# Patient Record
Sex: MALE | Race: BLACK OR AFRICAN AMERICAN | NOT HISPANIC OR LATINO | Employment: UNEMPLOYED | URBAN - METROPOLITAN AREA
[De-identification: names, ages, dates, MRNs, and addresses within clinical notes are randomized per-mention and may not be internally consistent; named-entity substitution may affect disease eponyms.]

---

## 2023-05-02 PROBLEM — R63.0 LOSS OF APPETITE: Status: RESOLVED | Noted: 2023-05-02 | Resolved: 2023-05-02

## 2023-05-02 PROBLEM — R19.09 GROIN SWELLING: Status: ACTIVE | Noted: 2023-05-02

## 2023-05-02 PROBLEM — R63.4 WEIGHT LOSS, UNINTENTIONAL: Status: ACTIVE | Noted: 2023-05-02

## 2023-05-02 PROBLEM — I10 PRIMARY HYPERTENSION: Status: ACTIVE | Noted: 2023-05-02

## 2023-05-02 PROBLEM — K11.1 PAROTID GLAND ENLARGEMENT: Status: ACTIVE | Noted: 2023-05-02

## 2023-05-02 PROBLEM — R19.00 RETROPERITONEAL MASS: Status: ACTIVE | Noted: 2023-05-02

## 2023-05-02 PROBLEM — N43.3 HYDROCELE, LEFT: Status: ACTIVE | Noted: 2023-05-02

## 2023-05-02 PROBLEM — R74.8 ELEVATED LIVER ENZYMES: Status: ACTIVE | Noted: 2023-05-02

## 2023-05-02 PROBLEM — R63.0 LOSS OF APPETITE: Status: ACTIVE | Noted: 2023-05-02

## 2023-05-05 ENCOUNTER — PATIENT OUTREACH (OUTPATIENT)
Dept: ADMINISTRATIVE | Facility: CLINIC | Age: 43
End: 2023-05-05

## 2023-05-05 NOTE — PROGRESS NOTES
C3 nurse spoke with Mayito Lima for a TCC post hospital discharge follow up call. The patient reports does not have a scheduled HOSFU appointment. C3 nurse was unable to schedule HOSFU appointment for Non-[facility] PCP. Patient advised to contact their PCP to schedule a HOSPFU within 5-7 days.

## 2023-07-21 PROBLEM — R79.89 LOW VITAMIN D LEVEL: Status: ACTIVE | Noted: 2023-07-21

## 2023-07-21 PROBLEM — R25.2 MUSCLE CRAMP: Status: ACTIVE | Noted: 2023-07-21

## 2023-07-21 PROBLEM — D86.9 SARCOIDOSIS: Chronic | Status: ACTIVE | Noted: 2023-07-21

## 2023-07-21 PROBLEM — Z79.52 LONG TERM SYSTEMIC STEROID USER: Status: ACTIVE | Noted: 2023-07-21

## 2023-07-21 PROBLEM — R73.03 PREDIABETES: Status: ACTIVE | Noted: 2023-07-21

## 2023-07-21 PROBLEM — E87.6 HYPOKALEMIA: Status: ACTIVE | Noted: 2023-07-21

## 2023-12-12 ENCOUNTER — OFFICE VISIT (OUTPATIENT)
Dept: INTERVENTIONAL RADIOLOGY/VASCULAR | Facility: CLINIC | Age: 43
End: 2023-12-12
Payer: MEDICAID

## 2023-12-12 DIAGNOSIS — R19.00 RETROPERITONEAL MASS: Primary | ICD-10-CM

## 2023-12-12 PROCEDURE — 99204 PR OFFICE/OUTPT VISIT, NEW, LEVL IV, 45-59 MIN: ICD-10-PCS | Mod: 95,,,

## 2023-12-12 PROCEDURE — 99204 OFFICE O/P NEW MOD 45 MIN: CPT | Mod: 95,,,

## 2023-12-12 NOTE — PROGRESS NOTES
Subjective     Patient ID: Mayito Lima is a 43 y.o. male.    Chief Complaint: right retroperitoneal mass.    Referral from Dr. Vazquez to schedule right retroperitoneal biopsy/aspiration.  43-year-old  male with PMH of hypertension (not taking medications advised him to follow up with PCP regarding this) and sarcoidosis (on prednisone) was found to have retroperitoneal mass. He denies any weight loss or fevers. PET scan is consistent with sarcoidosis mainly there is still a retroperitoneal mass that did not light up on PET scan. No other complaints today. Wife present during virtual visit.     Pmh and medications reviewed.  Not taking any blood thinners.  Does not use CPAP or O2 at home. No difficulty laying flat. Will schedule with moderate sedation.   Dr. Vazquez clinic note reviewed.     Review of Systems   Constitutional:  Negative for fatigue and fever.   Respiratory:  Negative for cough and shortness of breath.    Cardiovascular:  Negative for chest pain and leg swelling.   Gastrointestinal:  Positive for abdominal pain (right sided).   Musculoskeletal:  Positive for back pain (right sided).   Neurological:  Positive for headaches. Negative for facial asymmetry and speech difficulty.   Psychiatric/Behavioral:  Negative for behavioral problems and confusion.           Objective     Physical Exam  Constitutional:       Appearance: Normal appearance.      Comments: Virtual Visit.    HENT:      Head: Atraumatic.      Nose: Nose normal.   Eyes:      Conjunctiva/sclera: Conjunctivae normal.   Pulmonary:      Effort: Pulmonary effort is normal. No respiratory distress.   Neurological:      General: No focal deficit present.      Mental Status: He is alert and oriented to person, place, and time.   Psychiatric:         Mood and Affect: Mood normal.         Behavior: Behavior normal.            Assessment and Plan     1. Retroperitoneal mass  -     IR CT Guidance; Future; Expected date: 12/12/2023  -      Protime-INR; Future; Expected date: 12/12/2023    - Approved by Dr. Addison mckeon right retroperitoneal biopsy/aspiration.   - Discussed how the procedure will be performed, risks (including, but not limited to, pain, bleeding, infection, damage to nearby structures, and the need for additional procedures), benefits, possible complications, pre-post procedure expectations, and alternatives. The patient voices understanding and all questions have been answered.  The patient agrees to proceed as planned. Patient scheduled for 12/20 at [facility]. Pre-procedure handout with clinic phone number provided.    The patient location is: [place]  The chief complaint leading to consultation is: Abnormal PET    Visit type: audiovisual    30 minutes of total time spent on the encounter, which includes face to face time and non-face to face time preparing to see the patient (eg, review of tests), Obtaining and/or reviewing separately obtained history, Documenting clinical information in the electronic or other health record, Independently interpreting results (not separately reported) and communicating results to the patient/family/caregiver, or Care coordination (not separately reported).     Each patient to whom he or she provides medical services by telemedicine is:  (1) informed of the relationship between the physician and patient and the respective role of any other health care provider with respect to management of the patient; and (2) notified that he or she may decline to receive medical services by telemedicine and may withdraw from such care at any time.    Alona Starks PA-C  Interventional Radiology  [phone]

## 2023-12-20 ENCOUNTER — HOSPITAL ENCOUNTER (OUTPATIENT)
Facility: OTHER | Age: 43
Discharge: HOME | End: 2023-12-20
Attending: RADIOLOGY | Admitting: RADIOLOGY
Payer: MEDICAID

## 2023-12-20 VITALS
HEIGHT: 71 IN | HEART RATE: 70 BPM | BODY MASS INDEX: 36.54 KG/M2 | DIASTOLIC BLOOD PRESSURE: 105 MMHG | OXYGEN SATURATION: 97 % | TEMPERATURE: 98 F | SYSTOLIC BLOOD PRESSURE: 163 MMHG | WEIGHT: 261 LBS | RESPIRATION RATE: 18 BRPM

## 2023-12-20 DIAGNOSIS — C80.1 CANCER: ICD-10-CM

## 2023-12-20 DIAGNOSIS — R19.00 RETROPERITONEAL MASS: ICD-10-CM

## 2023-12-20 PROCEDURE — 88112 CYTOPATH CELL ENHANCE TECH: CPT | Mod: 26,,, | Performed by: STUDENT IN AN ORGANIZED HEALTH CARE EDUCATION/TRAINING PROGRAM

## 2023-12-20 PROCEDURE — 99152 MOD SED SAME PHYS/QHP 5/>YRS: CPT | Performed by: RADIOLOGY

## 2023-12-20 PROCEDURE — 88112 CYTOPATH CELL ENHANCE TECH: CPT | Performed by: STUDENT IN AN ORGANIZED HEALTH CARE EDUCATION/TRAINING PROGRAM

## 2023-12-20 PROCEDURE — 63600175 PHARM REV CODE 636 W HCPCS: Performed by: RADIOLOGY

## 2023-12-20 PROCEDURE — 25000003 PHARM REV CODE 250: Performed by: RADIOLOGY

## 2023-12-20 RX ORDER — ACETAMINOPHEN 325 MG/1
650 TABLET ORAL EVERY 4 HOURS PRN
Status: DISCONTINUED | OUTPATIENT
Start: 2023-12-20 | End: 2023-12-20 | Stop reason: HOSPADM

## 2023-12-20 RX ORDER — MIDAZOLAM HYDROCHLORIDE 1 MG/ML
INJECTION INTRAMUSCULAR; INTRAVENOUS
Status: DISCONTINUED | OUTPATIENT
Start: 2023-12-20 | End: 2023-12-20 | Stop reason: HOSPADM

## 2023-12-20 RX ORDER — LIDOCAINE HYDROCHLORIDE 10 MG/ML
INJECTION INFILTRATION; PERINEURAL
Status: DISCONTINUED | OUTPATIENT
Start: 2023-12-20 | End: 2023-12-20 | Stop reason: HOSPADM

## 2023-12-20 RX ORDER — FENTANYL CITRATE 50 UG/ML
INJECTION, SOLUTION INTRAMUSCULAR; INTRAVENOUS
Status: DISCONTINUED | OUTPATIENT
Start: 2023-12-20 | End: 2023-12-20 | Stop reason: HOSPADM

## 2023-12-20 NOTE — H&P
"Consult/H&P Note  Interventional Radiology    Consult Requested By: Urology    Reason for Consult: retroperitoneal lesion    SUBJECTIVE:     Chief Complaint: sarcoid    History of Present Illness: 44 yo M with sarcoidosis found to have R retroperitoneal lesion.    Past Medical History:   Diagnosis Date    Hypertension      Past Surgical History:   Procedure Laterality Date    HERNIA REPAIR       Family History   Problem Relation Age of Onset    Hypertension Father      Social History     Tobacco Use    Smoking status: Never     Passive exposure: Never    Smokeless tobacco: Never   Substance Use Topics    Alcohol use: Not Currently    Drug use: Never       Review of Systems:  Constitutional/General:No fever, chills, change in appetite or weight loss.  Hematological/Immuno: no known coagulopathies  Respiratory: no shortness of breath  Cardiovascular: no chest pain  Gastrointestinal: no abdominal pain  Genito-Urinary: no dysuria  Musculoskeletal: negative  Skin: Negative for rash, itching, pigmentation changes, nail or hair changes.  Neurological: no TIA or stroke symptoms  Psychiatric: normal mood/affect, good insight/judgement      OBJECTIVE:     Vital Signs Range (Last 24H):  Temp:  [98.4 °F (36.9 °C)]   Pulse:  [70]   Resp:  [18]   BP: (160-168)/(104-109)   SpO2:  [97 %]     Physical Exam:  General- Patient alert and oriented x3 in NAD  ENT- PERRLA,  Neck- No masses  CV- Regular rate and rhythm  Resp-  No increased WOB  GI- Non tender/non-distended  Extrem- No cyanosis, clubbing, edema.   Derm- No rashes, masses, or lesions noted  Neuro-  No focal deficits noted.     Physical Exam  Body mass index is 36.4 kg/m².    Scheduled Meds:   Continuous Infusions:   PRN Meds:    Allergies: Review of patient's allergies indicates:  No Known Allergies    Labs:  Recent Labs   Lab 12/15/23  1032   INR 1.0     No results for input(s): "WBC", "HGB", "HCT", "MCV", "PLT" in the last 168 hours. No results for input(s): "GLU", "NA", " ""K", "CL", "CO2", "BUN", "CREATININE", "CALCIUM", "MG", "ALT", "AST", "ALBUMIN", "BILITOT", "BILIDIR" in the last 168 hours.    Vitals (Most Recent):  Temp: 98.4 °F (36.9 °C) (12/20/23 0846)  Pulse: 70 (12/20/23 0846)  Resp: 18 (12/20/23 0846)  BP: (!) 160/104 (12/20/23 0911)  SpO2: 97 % (12/20/23 0846)    ASA: 2  Mallampati: 2    Consent obtained    ASSESSMENT/PLAN:     Aspiration/biopsy of R retroperitoneal lesion.    There are no hospital problems to display for this patient.          Cy Mueller MD   "

## 2023-12-20 NOTE — PROCEDURES
Radiology Post-Procedure Note    Pre Op Diagnosis: retroperitoneal cyst  Post Op Diagnosis: Same    Procedure: aspiration    Procedure performed by: Cy Mueller MD    Written Informed Consent Obtained: Yes  Specimen Removed:  cc clear fluid  Estimated Blood Loss: Minimal    Findings:   Aspiration of R retroperitoneal cyst.    Patient tolerated procedure well.    Cy Mueller MD

## 2023-12-20 NOTE — PLAN OF CARE
Mayito Lima has met all discharge criteria from Phase II. Vital Signs are stable, ambulating  without difficulty. Discharge instructions given, patient verbalized understanding. Discharged from facility via wheelchair in stable condition.

## 2023-12-20 NOTE — DISCHARGE SUMMARY
Radiology Discharge Summary      Hospital Course: No complications    Admit Date: 12/20/2023  Discharge Date: 12/20/2023     Instructions Given to Patient: Yes  Diet: Resume prior diet  Activity: activity as tolerated and no driving for today    Description of Condition on Discharge: Stable  Vital Signs (Most Recent): Temp: 98.4 °F (36.9 °C) (12/20/23 0846)  Pulse: 69 (12/20/23 1010)  Resp: (!) 50 (12/20/23 1010)  BP: (!) 158/102 (12/20/23 1010)  SpO2: 99 % (12/20/23 1010)    Discharge Disposition: Home    Discharge Diagnosis: R retroperitoneal cyst     Follow-up: per referring physician    Cy Mueller MD

## 2023-12-31 LAB
FINAL PATHOLOGIC DIAGNOSIS: NORMAL
Lab: NORMAL

## 2024-02-20 PROBLEM — R07.9 CHEST PAIN: Status: ACTIVE | Noted: 2024-02-20

## 2024-02-20 PROBLEM — I51.7 DILATED VENTRICLE: Status: ACTIVE | Noted: 2024-02-20

## 2024-03-11 PROBLEM — R97.20 ELEVATED PSA, BETWEEN 10 AND LESS THAN 20 NG/ML: Status: ACTIVE | Noted: 2024-03-11

## 2024-04-15 PROBLEM — C61 PROSTATE CANCER: Status: ACTIVE | Noted: 2024-03-11
